# Patient Record
Sex: MALE | Race: WHITE | NOT HISPANIC OR LATINO | URBAN - METROPOLITAN AREA
[De-identification: names, ages, dates, MRNs, and addresses within clinical notes are randomized per-mention and may not be internally consistent; named-entity substitution may affect disease eponyms.]

---

## 2023-09-06 ENCOUNTER — OFFICE VISIT (OUTPATIENT)
Dept: SLEEP MEDICINE | Facility: CLINIC | Age: 53
End: 2023-09-06
Payer: COMMERCIAL

## 2023-09-06 VITALS
DIASTOLIC BLOOD PRESSURE: 99 MMHG | BODY MASS INDEX: 41.75 KG/M2 | SYSTOLIC BLOOD PRESSURE: 172 MMHG | HEIGHT: 73 IN | WEIGHT: 315 LBS | HEART RATE: 77 BPM

## 2023-09-06 DIAGNOSIS — G47.30 SLEEP APNEA, UNSPECIFIED TYPE: ICD-10-CM

## 2023-09-06 DIAGNOSIS — I10 HYPERTENSION, UNSPECIFIED TYPE: ICD-10-CM

## 2023-09-06 DIAGNOSIS — R06.83 SNORING: ICD-10-CM

## 2023-09-06 DIAGNOSIS — G47.10 HYPERSOMNOLENCE: Primary | ICD-10-CM

## 2023-09-06 PROCEDURE — 3008F PR BODY MASS INDEX (BMI) DOCUMENTED: ICD-10-PCS | Mod: CPTII,S$GLB,, | Performed by: INTERNAL MEDICINE

## 2023-09-06 PROCEDURE — 99204 OFFICE O/P NEW MOD 45 MIN: CPT | Mod: S$GLB,,, | Performed by: INTERNAL MEDICINE

## 2023-09-06 PROCEDURE — 3080F DIAST BP >= 90 MM HG: CPT | Mod: CPTII,S$GLB,, | Performed by: INTERNAL MEDICINE

## 2023-09-06 PROCEDURE — 99204 PR OFFICE/OUTPT VISIT, NEW, LEVL IV, 45-59 MIN: ICD-10-PCS | Mod: S$GLB,,, | Performed by: INTERNAL MEDICINE

## 2023-09-06 PROCEDURE — 99999 PR PBB SHADOW E&M-NEW PATIENT-LVL III: ICD-10-PCS | Mod: PBBFAC,,, | Performed by: INTERNAL MEDICINE

## 2023-09-06 PROCEDURE — 3077F SYST BP >= 140 MM HG: CPT | Mod: CPTII,S$GLB,, | Performed by: INTERNAL MEDICINE

## 2023-09-06 PROCEDURE — 3008F BODY MASS INDEX DOCD: CPT | Mod: CPTII,S$GLB,, | Performed by: INTERNAL MEDICINE

## 2023-09-06 PROCEDURE — 99999 PR PBB SHADOW E&M-NEW PATIENT-LVL III: CPT | Mod: PBBFAC,,, | Performed by: INTERNAL MEDICINE

## 2023-09-06 PROCEDURE — 3080F PR MOST RECENT DIASTOLIC BLOOD PRESSURE >= 90 MM HG: ICD-10-PCS | Mod: CPTII,S$GLB,, | Performed by: INTERNAL MEDICINE

## 2023-09-06 PROCEDURE — 3077F PR MOST RECENT SYSTOLIC BLOOD PRESSURE >= 140 MM HG: ICD-10-PCS | Mod: CPTII,S$GLB,, | Performed by: INTERNAL MEDICINE

## 2023-09-06 NOTE — PROGRESS NOTES
"  NEW PATIENT VISIT    Matt Mireles  is a pleasant 52 y.o. male  with PMH significant for HTN, headaches, sinusitis, obesity who presents for evaluation of LOU.    Prior sleep studies:   No    Trouble falling asleep?:  No  Trouble staying asleep?: No  Hypnotic use?:   No    Bed partner:    Yes  Witnessed snoring ?:   Yes  Snoring arousals?:  Yes  Witnessed apneas?  Yes    Sleepy if inactive?:  Yes  ESS:    11    SLEEP SCHEDULE   Bed Time 8:30pm   Sleep Latency 5 min   Arousals 3-4   Back to sleep minutes   Wake time 4:30am   Naps On the weekends   Nocturia 3-4   Work Work in construction     Brother and mother with LOU on CPAP.    Vitals:    09/06/23 1309   BP: (!) 172/99   BP Location: Right arm   Patient Position: Sitting   BP Method: Medium (Automatic)   Pulse: 77   Weight: (!) 145.8 kg (321 lb 6.9 oz)   Height: 6' 1" (1.854 m)       Physical Exam:    GEN:   Well-appearing  Psych:  Appropriate affect, demonstrates insight  SKIN:  No rash on the face or bridge of the nose  Mallampati: Mallampati 3  Neck circ: 20.5 inches      LABS:   No results found for: "HGB", "CO2"    RECORDS REVIEWED PREVIOUSLY:    No prior sleep testing.    ASSESSMENT        8/30/2023     8:26 AM   EPWORTH SLEEPINESS SCALE   Sitting and reading 2   Watching TV 1   Sitting, inactive in a public place (e.g. a theatre or a meeting) 2   As a passenger in a car for an hour without a break 1   Lying down to rest in the afternoon when circumstances permit 3   Sitting and talking to someone 0   Sitting quietly after a lunch without alcohol 2   In a car, while stopped for a few minutes in traffic 0   Total score 11       Presenting Complaint:    PROBLEM DESCRIPTION/ Sx on Presentation  STATUS   suspected LOU   + snoring, + snoring arousals and gasping, + witnessed apneas     New   Daytime Sx   + sleepiness when inactive   ESS 11/24 on intake  New   Nocturia   x 3-4 per sleep period  New   Headaches   Frequent AM headaches     Other issues:     PLAN "     -recommend sleep testing   -discussed trial therapy if LOU present and the patient is  open to a trial of CPAP therapy    RTC          The patient was given open opportunity to ask questions and/or express concerns about treatment plan.   All questions/concerns were discussed.     Two patient identifiers used prior to evaluation.

## 2023-09-28 ENCOUNTER — HOSPITAL ENCOUNTER (OUTPATIENT)
Dept: SLEEP MEDICINE | Facility: HOSPITAL | Age: 53
Discharge: HOME | End: 2023-09-28
Attending: INTERNAL MEDICINE
Payer: COMMERCIAL

## 2023-09-28 DIAGNOSIS — G47.10 HYPERSOMNOLENCE: ICD-10-CM

## 2023-09-28 DIAGNOSIS — R06.83 SNORING: ICD-10-CM

## 2023-09-28 DIAGNOSIS — I10 HYPERTENSION, UNSPECIFIED TYPE: ICD-10-CM

## 2023-09-28 DIAGNOSIS — G47.30 SLEEP APNEA, UNSPECIFIED TYPE: ICD-10-CM

## 2023-09-28 PROCEDURE — 95800 SLP STDY UNATTENDED: CPT

## 2023-09-28 NOTE — PATIENT INSTRUCTIONS
Your Inspire stimulator was activated today. You cab start using it starting tonight, and every night. Please go up on your remote one level a week (). I will call and follow up with you on () and check how you are progressing. You have an appointment to see () for follow up. At any time if you a have any questions or concern, please call us.

## 2023-09-28 NOTE — PROGRESS NOTES
The patient ID was verified. He was instructed on how to turn the Home Sleep Testing device on and off, how to apply the sensors (Watch Pat). He was encouraged to sleep on supine position and must have 6 hours of sleep. The patient was instructed not to get the device wet and return it back to us. All questions were answered prior to patient leaving. He  was provided the after visit summary.

## 2023-09-29 PROBLEM — G47.10 HYPERSOMNOLENCE: Status: ACTIVE | Noted: 2023-09-29

## 2023-10-03 PROCEDURE — 95806 SLEEP STUDY UNATT&RESP EFFT: CPT | Mod: 26,,, | Performed by: INTERNAL MEDICINE

## 2023-10-03 PROCEDURE — 95806 PR SLEEP STUDY, UNATTENDED, SIMUL RECORD HR/O2 SAT/RESP FLOW/RESP EFFT: ICD-10-PCS | Mod: 26,,, | Performed by: INTERNAL MEDICINE

## 2023-10-05 ENCOUNTER — PATIENT MESSAGE (OUTPATIENT)
Dept: SLEEP MEDICINE | Facility: CLINIC | Age: 53
End: 2023-10-05
Payer: COMMERCIAL

## 2023-10-05 DIAGNOSIS — G47.33 SEVERE OBSTRUCTIVE SLEEP APNEA: Primary | ICD-10-CM

## 2023-10-13 ENCOUNTER — TELEPHONE (OUTPATIENT)
Dept: SLEEP MEDICINE | Facility: OTHER | Age: 53
End: 2023-10-13
Payer: COMMERCIAL

## 2023-10-20 ENCOUNTER — PATIENT MESSAGE (OUTPATIENT)
Dept: SLEEP MEDICINE | Facility: CLINIC | Age: 53
End: 2023-10-20
Payer: COMMERCIAL

## 2023-10-27 ENCOUNTER — HOSPITAL ENCOUNTER (OUTPATIENT)
Dept: SLEEP MEDICINE | Facility: OTHER | Age: 53
Discharge: HOME | End: 2023-10-27
Attending: INTERNAL MEDICINE
Payer: COMMERCIAL

## 2023-10-27 DIAGNOSIS — G47.33 SEVERE OBSTRUCTIVE SLEEP APNEA: ICD-10-CM

## 2023-10-27 PROCEDURE — 95811 POLYSOM 6/>YRS CPAP 4/> PARM: CPT

## 2023-10-28 PROBLEM — G47.33 SEVERE OBSTRUCTIVE SLEEP APNEA: Status: ACTIVE | Noted: 2023-10-28

## 2023-10-28 NOTE — PROGRESS NOTES
Kenney Mireles to [facility] on 10/27/2023 for an overnight CPAP titration study.     Pt wore a M Airfit FFM.       Post study information given to pt in AM

## 2023-10-30 NOTE — PROCEDURES
"[facility]    Titration Interpretation Report    Patient Name:  MARISSA MODI  MRN#:  [id number]  :  1970  Study Date:  10/27/2023  Referring Provider:  SALEEM HERMAN MD    The patient is a 52 year old Male who is 6' 1" and weighs 321.0 lbs.  His BMI equals 42.5.  A full night PAP titration was performed.    Polysomnogram Data  A full night polysomnogram recorded the standard physiologic parameters including EEG, EOG, EMG, EKG, nasal and oral airflow.  Respiratory parameters of chest and abdominal movements were recorded with Pe[place]-Crystal motion transducers.  Oxygen saturation was recorded by pulse oximetry.    Titration Summary  The patient was titrated at pressures ranging from 5* cm/H20 with supplemental oxygen at - up to 15* cm/H20 with supplemental oxygen at -.  The last pressure used in the study was 15* cm/H20 with supplemental oxygen at -.    Sleep Architecture  The total recording time of the polysomnogram was 491.7 minutes.  The total sleep time was 345.0 minutes.  The patient spent 11.6% of total sleep time in Stage N1, 53.3% in Stage N2, 1.4% in Stages N3, and 33.6% in REM.  Sleep latency was 35.8 minutes.  REM latency was 43.0 minutes.  Sleep Efficiency was 70.2%.  Total wake time was 147.0 minutes for a total wake percentage of 24.2%.  Wake after Sleep Onset was 111.0 minutes.    Respiratory Summary  The polysomnogram revealed a presence of 1 obstructive, 37 central, and - mixed apneas resulting in Total Apnea index of 6.6 events per hour.  There were 38 hypopneas resulting in Total Hypopnea index of 6.6 events per hour.  The combined Apnea/Hypopnea index was 13.2 events per hour.  There were a total of - RERA events resulting in a Respiratory Disturbance Index (RDI) of 13.2 events per hour.     Mean oxygen saturation was 95.2%.  The lowest oxygen saturation during sleep was 87.0%.  Time spent ?88% oxygen saturation was 1.4 minutes (0.3%).    Limb Movement Activity  There were " "85 limb movements recorded.  Of this total, 85 were classified as PLMs.  Of the PLMs, 1 were associated with arousals.  The Limb Movement index was 14.8 per hour while the PLM index was 14.8 per hour and PLM with arousals index was 0.2 per hour.    Cardiac: single lead EKG revealed normal sinus rhythm     PAP titration:    Mask used in the study: M Airfit FFM.  PAP = 7 cwp was largely effective in lateral N3 and lateral REM sleep.  PAP = 10 cwp was partially effective in supine N2 sleep.  PAP = 13 cwp was largely effective in lateral REM sleep.    LIMITATIONS: intermittent sleep onset centrals were seen at 13cwp and above    Oxygenation:  At therapeutic levels of PAP therapy, there was no baseline hypoxemia    Impression:  -obstructive sleep apnea     Recommendations:    -initial auto-CPAP settings of CPAP min = 7 cwp  and CPAP max =  10 cwp are recommended  -if the patient has difficulty initiating sleep or has significant sleep disruption at the above pressures, CPAP min should be adjusted to 12cwp or higher depending on pressure tolerance  -the patient has follow up with Sleep Medicine        Tu Herman MD    (This Sleep Study was interpreted by a Board Certified Sleep Specialist who conducted an epoch-by-epoch review of the entire raw data recording.)  (The indication for this sleep study was reviewed and deemed appropriate by AASM Practice Parameters or other reasons by a Board Certified Sleep Specialist.)      [facility]    Titration Report    Patient Name: MARISSA MODI Study Date: 10/27/2023   YOB: 1970 MRN #: [id number]   Age: 52 year THIERNO #: [id number]   Sex: Male Referring Provider: SALEEM HERMAN MD   Height: 6' 1" Recording Tech: Sher Martinez RPSGT   Weight: 321.0 lbs Scoring Tech: Molina Varma RRT RPSGT   BMI: 42.5 Interpreting Physician: -   ESS: - Neck Circumference: -     Study Overview    Lights Off: 08:50:49 PM  Count Index   Lights On: 05:02:29 AM Awakenings: 38 " 6.6   Time in Bed: 491.7 min. Arousals: 79 13.7   Total Sleep Time: 345.0 min. Apneas & Hypopneas: 76 13.2    Sleep Efficiency: 70.2% Limb Movements: 85 14.8   Sleep Latency: 35.8 min. Snores: - -   Wake After Sleep Onset: 111.0 min. Desaturations: 125 21.7    REM Latency from Sleep Onset: 43.0 min. Minimum SpO2 TST: 87.0%      Sleep Architecture   % of Time in Bed  Stages Time (mins) % Sleep Time   Wake 147.0    Stage N1 40.0 11.6%   Stage N2 184.0 53.3%   Stage N3 5.0 1.4%   .0 33.6%         Arousal Summary     NREM REM Sleep Index   Respiratory Arousals 19 - 19 3.3   PLM Arousals 1 - 1 0.2   Isolated Limb Movement Arousals - - - -   Spontaneous Arousals 52 7 59 10.3   Total 72 7 79 13.7       Limb Movement Summary     Count Index   Isolated Limb Movements - -   Periodic Limb Movements (PLMs) 85 14.8   Total Limb Movements 85 14.8   Respiratory Summary     By Sleep Stage By Body Position Total    NREM REM Supine Non-Supine    Time (min) 229.0 116.0 38.0 307.0 345.0           Obstructive Apnea 1 - 1 - 1   Mixed Apnea - - - - -   Central Apnea 37 - 19 18 37   Central Apnea Index 9.7 - 30.0 5.7 9.7   Total Apneas 38 - 20 18 38   Total Apnea Index 10.0 - 31.6 3.5 6.6           Total Hypopnea 37 1 12 26 38   Total Hypopnea Index 9.7 0.5 18.9 5.1 6.6           Apnea & Hypopnea 75 1 32 44 76   Apnea & Hypopnea Index 19.7 0.5 50.5 8.6 13.2           RERAs - - - - -   RERA Index - - - - -           RDI 19.7 0.5 50.5 8.6 13.2     Scoring Criteria: Hypopneas scored at 3% desaturation criteria.    Respiratory Event Durations     Apnea Hypopnea    NREM REM NREM REM   Average (seconds) 14.3 - 15.8 15.3   Maximum (seconds) 20.3 - 20.8 15.3       Oxygen Saturation Summary     Wake NREM REM TST Total   Average SpO2 96.1% 94.4% 95.6% 94.8% 95.2%   Minimum SpO2 88.0% 87.0% 90.0% 87.0% 87.0%   Maximum SpO2 100.0% 99.0% 98.0% 99.0% 100.0%     Oxygen Saturation Distribution    Range (%) Time in range (min) Time in range (%)     90.0 - 100.0 473.4 97.3%   80.0 - 90.0 13.2 2.7%   70.0 - 80.0 - -   60.0 - 70.0 - -   50.0 - 60.0 - -   0.0 - 50.0 - -   Time Spent ?88% SpO2    Range (%) Time in range (min) Time in range (%)   0.0 - 88.0 1.4 0.3%          Count Index   Desaturations 125 21.7      Cardiac Summary     Wake NREM REM Sleep Total   Average Pulse Rate (BPM) 69.3 66.7 69.9 67.8 68.2   Minimum Pulse Rate (BPM) 38.0 53.0 57.0 53.0 38.0   Maximum Pulse Rate (BPM) 102.0 86.0 91.0 91.0 102.0     Pulse Rate Distribution    Range (bpm) Time in range (min) Time in range (%)   0.0 - 40.0 0.0 0.0%   40.0 - 60.0 39.1 8.0%   60.0 - 80.0 429.0 88.1%   80.0 - 100.0 18.5 3.8%   100.0 - 120.0 0.1 0.0%   120.0 - 140.0 - -   140.0 - 200.0 - -     EtCO2 Summary    Stage Min (mmHg) Average (mmHg) Max (mmHg)   Wake - - -   NREM(1+2+3) - - -   REM - - -     Range (mmHg) Time in range (min) Time in range (%)   20.0 - 40.0 - -   40.0 - 50.0 - -   50.0 - 55.0 - -   55.0 - 100.0 - -   Excluded data <20.0 & >100.0 492.0 100.0%     TcCO2 Summary    Stage Min (mmHg) Average (mmHg) Max (mmHg)   Wake - - -   NREM(1+2+3) - - -   REM - - -     Range (mmHg) Time in range (min) Time in range (%)   20.0 - 40.0 - -   40.0 - 50.0 - -   50.0 - 55.0 - -   55.0 - 100.0 - -   Excluded data <20.0 & >100.0 492.0 100.0%     Comments    -    Titration Summary    PAP Device PAP Level O2 Level Time (min) TST (min) NREM (min) REM (min) Wake (min) Sleep Eff% OA# CA# MA# Hyp# AHI RERA RDI Min SpO2 SpO2 ?88% (min) Ar. Index   CPAP 5 - 69.0 25.0 25.0 0.0 44.0 36.2% - - - 13 31.2 - 31.2 87.0  0.4 43.2   CPAP 7 - 58.5 54.0 42.0 12.0 4.5 92.3% - - - 1 1.1 - 1.1 90.0  0.0 7.8   CPAP 9 - 4.0 4.0 4.0 0.0 0.0 100.0% - - - 4 60.0 - 60.0 89.0  0.0 -   CPAP 10 - 28.5 11.0 11.0 0.0 17.5 38.6% - 1 - - 5.5 - 5.5 89.0  0.0 5.5   CPAP 11 - 4.0 4.0 4.0 0.0 0.0 100.0% - - - 1 15.0 - 15.0 90.0  0.0 15.0   CPAP 12 - 6.5 6.0 6.0 0.0 0.5 92.3% - 1 - 5 60.0 - 60.0 89.0  0.0 50.0   CPAP 13 - 150.5 123.5 85.0  38.5 27.0 82.1% 1 19 - 6 12.6 - 12.6 87.0  0.4 12.6   CPAP 14 - 166.5 113.0 50.0 63.0 53.5 67.9% - 16 - 8 12.7 - 12.7 88.0  0.0 10.6   CPAP 15 - 4.5 4.5 2.0 2.5 0.0 100.0% - - - - - - - 93.0  0.0 13.3

## 2023-11-02 ENCOUNTER — PATIENT MESSAGE (OUTPATIENT)
Dept: SLEEP MEDICINE | Facility: CLINIC | Age: 53
End: 2023-11-02
Payer: COMMERCIAL